# Patient Record
Sex: FEMALE | Race: WHITE
[De-identification: names, ages, dates, MRNs, and addresses within clinical notes are randomized per-mention and may not be internally consistent; named-entity substitution may affect disease eponyms.]

---

## 2021-04-23 ENCOUNTER — HOSPITAL ENCOUNTER (EMERGENCY)
Dept: HOSPITAL 94 - ER | Age: 41
Discharge: HOME | End: 2021-04-23
Payer: COMMERCIAL

## 2021-04-23 VITALS — WEIGHT: 255.54 LBS | HEIGHT: 66 IN | BODY MASS INDEX: 41.07 KG/M2

## 2021-04-23 VITALS — SYSTOLIC BLOOD PRESSURE: 172 MMHG | DIASTOLIC BLOOD PRESSURE: 100 MMHG

## 2021-04-23 DIAGNOSIS — R19.7: ICD-10-CM

## 2021-04-23 DIAGNOSIS — R11.0: ICD-10-CM

## 2021-04-23 DIAGNOSIS — F41.9: Primary | ICD-10-CM

## 2021-04-23 DIAGNOSIS — Z79.899: ICD-10-CM

## 2021-04-23 DIAGNOSIS — E87.6: ICD-10-CM

## 2021-04-23 DIAGNOSIS — I10: ICD-10-CM

## 2021-04-23 DIAGNOSIS — F12.90: ICD-10-CM

## 2021-04-23 LAB
ALBUMIN SERPL BCP-MCNC: 4.2 G/DL (ref 3.4–5)
ALBUMIN/GLOB SERPL: 0.9 {RATIO} (ref 1.1–1.5)
ALP SERPL-CCNC: 100 IU/L (ref 46–116)
ALT SERPL W P-5'-P-CCNC: 20 U/L (ref 12–78)
ANION GAP SERPL CALCULATED.3IONS-SCNC: 13 MMOL/L (ref 8–16)
ANISOCYTOSIS BLD QL SMEAR: (no result)
AST SERPL W P-5'-P-CCNC: 19 U/L (ref 10–37)
BASOPHILS # BLD AUTO: 0 X10'3 (ref 0–0.2)
BASOPHILS NFR BLD AUTO: 0.3 % (ref 0–1)
BILIRUB SERPL-MCNC: 0.3 MG/DL (ref 0.1–1)
BUN SERPL-MCNC: 11 MG/DL (ref 7–18)
BUN/CREAT SERPL: 11 (ref 6.6–38)
CALCIUM SERPL-MCNC: 9 MG/DL (ref 8.5–10.1)
CHLORIDE SERPL-SCNC: 104 MMOL/L (ref 99–107)
CO2 SERPL-SCNC: 24 MMOL/L (ref 24–32)
CREAT SERPL-MCNC: 1 MG/DL (ref 0.4–0.9)
EOSINOPHIL # BLD AUTO: 0 X10'3 (ref 0–0.9)
EOSINOPHIL NFR BLD AUTO: 0.1 % (ref 0–6)
ERYTHROCYTE [DISTWIDTH] IN BLOOD BY AUTOMATED COUNT: 18.2 % (ref 11.5–14.5)
GFR SERPL CREATININE-BSD FRML MDRD: 61 ML/MIN
GLUCOSE SERPL-MCNC: 110 MG/DL (ref 70–104)
HCT VFR BLD AUTO: 32.9 % (ref 35–45)
HGB BLD-MCNC: 10 G/DL (ref 12–16)
HYPOCHROMIA BLD QL SMEAR: (no result)
LYMPHOCYTES # BLD AUTO: 0.7 X10'3 (ref 1.1–4.8)
LYMPHOCYTES NFR BLD AUTO: 6 % (ref 21–51)
MCH RBC QN AUTO: 19.2 PG (ref 27–31)
MCHC RBC AUTO-ENTMCNC: 30.4 G/DL (ref 33–36.5)
MCV RBC AUTO: 63.1 FL (ref 78–98)
MICROCYTES BLD QL SMEAR: (no result)
MONOCYTES # BLD AUTO: 0.2 X10'3 (ref 0–0.9)
MONOCYTES NFR BLD AUTO: 1.4 % (ref 2–12)
NEUTROPHILS # BLD AUTO: 11.3 X10'3 (ref 1.8–7.7)
NEUTROPHILS NFR BLD AUTO: 92.2 % (ref 42–75)
PLATELET # BLD AUTO: 453 X10'3 (ref 140–440)
PLATELET BLD QL SMEAR: (no result)
PMV BLD AUTO: 8.6 FL (ref 7.4–10.4)
POTASSIUM SERPL-SCNC: 3.2 MMOL/L (ref 3.5–5.1)
PROT SERPL-MCNC: 8.9 G/DL (ref 6.4–8.2)
RBC # BLD AUTO: 5.21 X10'6 (ref 4.2–5.6)
RBC MORPH BLD: (no result)
SODIUM SERPL-SCNC: 141 MMOL/L (ref 135–145)
WBC # BLD AUTO: 12.2 X10'3 (ref 4.5–11)

## 2021-04-23 PROCEDURE — 96372 THER/PROPH/DIAG INJ SC/IM: CPT

## 2021-04-23 PROCEDURE — 99285 EMERGENCY DEPT VISIT HI MDM: CPT

## 2021-04-23 PROCEDURE — 80053 COMPREHEN METABOLIC PANEL: CPT

## 2021-04-23 PROCEDURE — 85025 COMPLETE CBC W/AUTO DIFF WBC: CPT

## 2021-04-23 PROCEDURE — 85008 BL SMEAR W/O DIFF WBC COUNT: CPT

## 2021-04-23 PROCEDURE — 36415 COLL VENOUS BLD VENIPUNCTURE: CPT

## 2021-04-23 NOTE — NUR
RELIEVING RN FOR BREAK, PT IS RESTING QUIETLY ON BED, C/O ANXIETY ATTACK 
YESTERDAY AND TODAY, USUALLY CONTROLLED WITH BREATHING, ESSENTIAL OILS ETC.  PT 
IS UNDER INCREASED STRESS WITH COVID AND JOB AS A , DRINKING MORE 
ENERGY DRINKS, PT ALSO C/O HEADACHE, NECK AND SHOULDER TENSION, +NAUSEA, 
DYSPNEA, FEELING SHAKY. TALKED WITH PT ABOUT STARTING EXERCISE PROGRAM, STAYING 
HYDRATED AND NOT DRINKING ENERGY DRINKS, WAITING TO BE EVALUATED

## 2021-04-23 NOTE — NUR
RELIEVING RN FOR BREAK, PT IS RESTING QUIETLY IN DARK ROOM, WAITING FOR LAB 
RESULTS, ABLE TO RADHA SIPPING WATER, NO VOMITING